# Patient Record
(demographics unavailable — no encounter records)

---

## 2025-03-05 NOTE — HISTORY OF PRESENT ILLNESS
[de-identified] : Ms. GIULIA MONTANA is a 48 year old woman presents today for initial evaluation of a right ankle injury sustained on 2/28/25 after stepping into a ditch and twisting her ankle. She was seen at [facility] ED where xrays were performed. She presents today in an air cast and has been weightbearing with a cane. She notes pain at the lateral aspect of the right ankle. 
[de-identified] : Ms. GIULIA MONTANA is a 48 year old woman presents today for initial evaluation of a right ankle injury sustained on 2/28/25 after stepping into a ditch and twisting her ankle. She was seen at [facility] ED where xrays were performed. She presents today in an air cast and has been weightbearing with a cane. She notes pain at the lateral aspect of the right ankle. 
<-- Click to add NO pertinent Family History

## 2025-03-05 NOTE — PHYSICAL EXAM
[de-identified] : The patient is sitting comfortably in the exam room.  RIGHT ankle -Skin is intact, swelling, no ecchymosis -No pain with palpation over the medial malleolus -No pain with palpation over the dorsum of the foot, no plantar ecchymoses, no pain with movement of the first metatarsal relative to the second metatarsal -No subluxation of the peroneal tendons -Dorsiflexion: neutral, Plantarflexion: 5 -Sensation is intact L1-S1 -5/5 EHL, FHL, TA, GS -Foot is warm and well-perfused, palpable dorsalis pedis pulse  [de-identified] : Xrays of the right ankle, weightbearing, were taken in the office today, 3/5/25 AP, mortise, lateral.  X-rays show a fracture of the distal fibula above the level of the plafond.  There is diastases between the distal aspect of the fracture and the incisura indicating widening of the syndesmosis at that portion.

## 2025-03-05 NOTE — DISCUSSION/SUMMARY
[de-identified] : 48-year-old woman with a right distal fibula fracture with widening of the syndesmosis, approximately 5 days out.   -The risks and benefits of operative versus nonoperative management were discussed at length with the patient. The patient shows a good understanding of the injury and treatment options. They would like to move forward with operative management. We discussed the risks and benefits of surgery which include bleeding, nerve damage and infection. Operative management benefits include reducing the risk of post traumatic arthritis of the ankle.  The patient would like to think about treatment options little bit longer. -NWB right LE in CAM boot -CAM boot prescribed  -Ice and elevation -The patient will think about surgery and if so, we can schedule surgery for 3/11/25 -Follow-up in the office 2 weeks after surgery  -All of the patient's questions and concerns were addressed.

## 2025-03-05 NOTE — DISCUSSION/SUMMARY
[de-identified] : 48-year-old woman with a right distal fibula fracture with widening of the syndesmosis, approximately 5 days out.   -The risks and benefits of operative versus nonoperative management were discussed at length with the patient. The patient shows a good understanding of the injury and treatment options. They would like to move forward with operative management. We discussed the risks and benefits of surgery which include bleeding, nerve damage and infection. Operative management benefits include reducing the risk of post traumatic arthritis of the ankle.  The patient would like to think about treatment options little bit longer. -NWB right LE in CAM boot -CAM boot prescribed  -Ice and elevation -The patient will think about surgery and if so, we can schedule surgery for 3/11/25 -Follow-up in the office 2 weeks after surgery  -All of the patient's questions and concerns were addressed.

## 2025-03-05 NOTE — PHYSICAL EXAM
[de-identified] : The patient is sitting comfortably in the exam room.  RIGHT ankle -Skin is intact, swelling, no ecchymosis -No pain with palpation over the medial malleolus -No pain with palpation over the dorsum of the foot, no plantar ecchymoses, no pain with movement of the first metatarsal relative to the second metatarsal -No subluxation of the peroneal tendons -Dorsiflexion: neutral, Plantarflexion: 5 -Sensation is intact L1-S1 -5/5 EHL, FHL, TA, GS -Foot is warm and well-perfused, palpable dorsalis pedis pulse  [de-identified] : Xrays of the right ankle, weightbearing, were taken in the office today, 3/5/25 AP, mortise, lateral.  X-rays show a fracture of the distal fibula above the level of the plafond.  There is diastases between the distal aspect of the fracture and the incisura indicating widening of the syndesmosis at that portion.